# Patient Record
Sex: FEMALE | Race: BLACK OR AFRICAN AMERICAN | URBAN - METROPOLITAN AREA
[De-identification: names, ages, dates, MRNs, and addresses within clinical notes are randomized per-mention and may not be internally consistent; named-entity substitution may affect disease eponyms.]

---

## 2020-06-18 ENCOUNTER — LAB OUTSIDE AN ENCOUNTER (OUTPATIENT)
Dept: URBAN - METROPOLITAN AREA CLINIC 40 | Facility: CLINIC | Age: 55
End: 2020-06-18

## 2020-06-18 ENCOUNTER — OFFICE VISIT (OUTPATIENT)
Dept: URBAN - METROPOLITAN AREA CLINIC 40 | Facility: CLINIC | Age: 55
End: 2020-06-18
Payer: COMMERCIAL

## 2020-06-18 DIAGNOSIS — R10.84 GENERALIZED ABDOMINAL PAIN: ICD-10-CM

## 2020-06-18 DIAGNOSIS — K52.9 COLITIS: ICD-10-CM

## 2020-06-18 DIAGNOSIS — K62.5 RECTAL BLEEDING: ICD-10-CM

## 2020-06-18 DIAGNOSIS — K59.03 DRUG INDUCED CONSTIPATION: ICD-10-CM

## 2020-06-18 DIAGNOSIS — T40.2X5A ADVERSE EFFECT OF OTHER OPIOIDS, INITIAL ENCOUNTER: ICD-10-CM

## 2020-06-18 PROCEDURE — G2100 PT 66+ FRAILTY AND MED DEM: HCPCS | Performed by: INTERNAL MEDICINE

## 2020-06-18 PROCEDURE — G9716 BMI DOC ONL FUP NOT CMPLTD: HCPCS | Performed by: INTERNAL MEDICINE

## 2020-06-18 PROCEDURE — G9907 DOC MED RSN NO TBCO INTERV: HCPCS | Performed by: INTERNAL MEDICINE

## 2020-06-18 PROCEDURE — 99214 OFFICE O/P EST MOD 30 MIN: CPT | Performed by: INTERNAL MEDICINE

## 2020-06-18 RX ORDER — SODIUM, POTASSIUM,MAG SULFATES 17.5-3.13G
17.5-13.3-1.6 GM/177ML SOLUTION, RECONSTITUTED, ORAL ORAL
Qty: 177 MILLILITER | Refills: 0 | OUTPATIENT
Start: 2020-06-18 | End: 2020-06-19

## 2020-06-18 RX ORDER — DICYCLOMINE HYDROCHLORIDE 20 MG/1
TAKE 1 TABLET (20 MG) BY ORAL ROUTE 3 TIMES PER DAY FOR 30 DAYS TABLET ORAL
Qty: 90 | Refills: 3 | Status: ON HOLD | COMMUNITY
Start: 2020-04-14 | End: 2020-08-12

## 2020-06-18 RX ORDER — LUBIPROSTONE 24 UG/1
TAKE ONE CAPSULE BY MOUTH TWICE A DAY ***START IN THE EVENING ONCE DAILY WITH FOOD FOR 2 DAYS THEN INCREASE TO TWICE DAILY*** CAPSULE, GELATIN COATED ORAL
Qty: 60 | Refills: 2 | Status: ON HOLD | COMMUNITY
Start: 2015-04-14

## 2020-06-18 RX ORDER — ONDANSETRON HYDROCHLORIDE 4 MG/1
TAKE 2 TABLETS BY ORAL ROUTE 2 TIMES A DAY FOR 30 DAYS TABLET, FILM COATED ORAL 2
Qty: 120 | Refills: 1 | Status: ON HOLD | COMMUNITY
Start: 2020-04-28 | End: 2020-06-27

## 2020-06-18 RX ORDER — HYDROCORTISONE ACETATE 25 MG/1
INSERT 1 SUPPOSITORY BY RECTAL ROUTE 2 TIMES A DAY FOR 30 DAYS SUPPOSITORY RECTAL 2
Qty: 60 | Refills: 3 | Status: ON HOLD | COMMUNITY
Start: 2020-04-14 | End: 2020-08-12

## 2020-06-18 RX ORDER — PANTOPRAZOLE SODIUM 40 MG
TAKE ONE TABLET BY MOUTH TWICE A DAY TABLET, DELAYED RELEASE (ENTERIC COATED) ORAL
Qty: 60 | Refills: 2 | Status: ON HOLD | COMMUNITY
Start: 2015-04-14

## 2020-07-07 ENCOUNTER — OFFICE VISIT (OUTPATIENT)
Dept: URBAN - METROPOLITAN AREA SURGERY CENTER 30 | Facility: SURGERY CENTER | Age: 55
End: 2020-07-07
Payer: COMMERCIAL

## 2020-07-07 DIAGNOSIS — K62.5 ANAL BLEEDING: ICD-10-CM

## 2020-07-07 DIAGNOSIS — R19.7 ACUTE DIARRHEA: ICD-10-CM

## 2020-07-07 PROCEDURE — 45380 COLONOSCOPY AND BIOPSY: CPT | Performed by: INTERNAL MEDICINE

## 2020-07-07 PROCEDURE — G9937 DIG OR SURV COLSCO: HCPCS | Performed by: INTERNAL MEDICINE

## 2020-07-07 PROCEDURE — G8907 PT DOC NO EVENTS ON DISCHARG: HCPCS | Performed by: INTERNAL MEDICINE

## 2020-07-07 RX ORDER — DICYCLOMINE HYDROCHLORIDE 20 MG/1
TAKE 1 TABLET (20 MG) BY ORAL ROUTE 3 TIMES PER DAY FOR 30 DAYS TABLET ORAL
Qty: 90 | Refills: 3 | Status: ON HOLD | COMMUNITY
Start: 2020-04-14 | End: 2020-08-12

## 2020-07-07 RX ORDER — PANTOPRAZOLE SODIUM 40 MG
TAKE ONE TABLET BY MOUTH TWICE A DAY TABLET, DELAYED RELEASE (ENTERIC COATED) ORAL
Qty: 60 | Refills: 2 | Status: ON HOLD | COMMUNITY
Start: 2015-04-14

## 2020-07-07 RX ORDER — LUBIPROSTONE 24 UG/1
TAKE ONE CAPSULE BY MOUTH TWICE A DAY ***START IN THE EVENING ONCE DAILY WITH FOOD FOR 2 DAYS THEN INCREASE TO TWICE DAILY*** CAPSULE, GELATIN COATED ORAL
Qty: 60 | Refills: 2 | Status: ON HOLD | COMMUNITY
Start: 2015-04-14

## 2020-07-07 RX ORDER — HYDROCORTISONE ACETATE 25 MG/1
INSERT 1 SUPPOSITORY BY RECTAL ROUTE 2 TIMES A DAY FOR 30 DAYS SUPPOSITORY RECTAL 2
Qty: 60 | Refills: 3 | Status: ON HOLD | COMMUNITY
Start: 2020-04-14 | End: 2020-08-12

## 2020-07-07 NOTE — HPI-TODAY'S VISIT:
She had colonoscopy in 2017 that was negative other than noting hemorrhoids.  She had an upper endoscopy in 2013 that was essentially unremarkable, empiric dilation was performed at that time.  She saw Dr. Christian couple of months ago for evaluation of rectal bleeding.  She complained of several months of passing blood and mucus.  CT angiogram revealed no active bleeding in the hospital but there was evidence of colitis extending from the transverse colon to the sigmoid colon and the suspicion was acute ischemic colitis. Since last visit patient has seen her pain doctor and was prescribed Movantik.  She took 1 pill only.  She does not want to take laxatives.  She continues to take oxycodone and experiencing opioid-induced constipation.  The rectal bleeding has stopped but she still has mucus and abdominal cramping diffusely.  She would like acute intervention and evaluation of possible.

## 2020-08-06 ENCOUNTER — OFFICE VISIT (OUTPATIENT)
Dept: URBAN - METROPOLITAN AREA CLINIC 40 | Facility: CLINIC | Age: 55
End: 2020-08-06

## 2020-08-24 ENCOUNTER — OFFICE VISIT (OUTPATIENT)
Dept: URBAN - METROPOLITAN AREA TELEHEALTH 2 | Facility: TELEHEALTH | Age: 55
End: 2020-08-24
Payer: COMMERCIAL

## 2020-08-24 DIAGNOSIS — R19.7 DIARRHEA: ICD-10-CM

## 2020-08-24 DIAGNOSIS — K57.30 COLON, DIVERTICULOSIS: ICD-10-CM

## 2020-08-24 DIAGNOSIS — R10.84 ABDOMINAL PAIN, GENERALIZED: ICD-10-CM

## 2020-08-24 DIAGNOSIS — K64.8 INTERNAL HEMORRHOIDS: ICD-10-CM

## 2020-08-24 PROCEDURE — G8427 DOCREV CUR MEDS BY ELIG CLIN: HCPCS | Performed by: INTERNAL MEDICINE

## 2020-08-24 PROCEDURE — 99213 OFFICE O/P EST LOW 20 MIN: CPT | Performed by: INTERNAL MEDICINE

## 2020-08-24 PROCEDURE — 3017F COLORECTAL CA SCREEN DOC REV: CPT | Performed by: INTERNAL MEDICINE

## 2020-08-24 RX ORDER — PANTOPRAZOLE SODIUM 40 MG
TAKE ONE TABLET BY MOUTH TWICE A DAY TABLET, DELAYED RELEASE (ENTERIC COATED) ORAL
Qty: 60 | Refills: 2 | Status: ON HOLD | COMMUNITY
Start: 2015-04-14

## 2020-08-24 RX ORDER — COLESTIPOL HYDROCHLORIDE 1 G/1
1 TABLET TABLET, FILM COATED ORAL BID PRN
Qty: 60 | Refills: 0 | OUTPATIENT
Start: 2020-08-24

## 2020-08-24 RX ORDER — PANTOPRAZOLE SODIUM 40 MG
1 TABLET TABLET, DELAYED RELEASE (ENTERIC COATED) ORAL ONCE A DAY
Qty: 30 | Refills: 0
Start: 2015-04-14

## 2020-08-24 RX ORDER — LUBIPROSTONE 24 UG/1
TAKE ONE CAPSULE BY MOUTH TWICE A DAY ***START IN THE EVENING ONCE DAILY WITH FOOD FOR 2 DAYS THEN INCREASE TO TWICE DAILY*** CAPSULE, GELATIN COATED ORAL
Qty: 60 | Refills: 2 | Status: ON HOLD | COMMUNITY
Start: 2015-04-14

## 2020-08-24 NOTE — PHYSICAL EXAM GASTROINTESTINAL
Self Exam: Abdomen soft, TTP in midepigastric region without guarding or rebound tendernness, non-distended

## 2020-08-24 NOTE — HPI-TODAY'S VISIT:
Ms. Mike is a 55 year old female who presents for telehealth visit. She did recently have colonoscopy with random biopsies by Dr. Shearer.  Essentially normal findings outside of small, nonbleeding internal hemorrhoids and sigmoid diverticulosis without diverticulitis.  No evidence of colitis.  The exam was technically difficult due to significant looping.  No other findings on exam.  Random biopsies unremarkable.  She had a prior colonoscopy in 2017 which was negative other than noting hemorrhoids.  She had an upper endoscopy in 2013 that was essentially unremarkable, empiric dilation was performed at that time.  She saw Dr. Mitchell several months ago for evaluation of rectal bleeding.  She complained of several months of passing blood and mucus.  CT angiogram revealed no active bleeding in the hospital but there was evidence of colitis extending from the transverse colon to the sigmoid colon and the suspicion was acute ischemic colitis. She has chronic pain on opioid therapy. With previous visit, she complained of rectal bleeding. The rectal bleeding has stopped but she still has mucus and abdominal cramping diffusely with diarrhea.  Today, she admits that she continues to have loose stools, up to 6-7 times daily, without rectal bleeding.  Having general abdominal cramping and focused epigastric discomfort with heartburn and indigestion.  Denies nausea, vomiting, or dysphagia.  Fair appetite, typically eating only once daily.  She is visiting with family in [place] and will return to [place] in the next 1 to 2 weeks.  Admits that discomfort in the epigastric region is exacerbated by ingestion of greasy food as well as dairy, which she is trying to limit.  She does not drink alcohol or eat much sweets.  Denies use of NSAIDs.  No other complaints.

## 2020-09-03 ENCOUNTER — OFFICE VISIT (OUTPATIENT)
Dept: URBAN - METROPOLITAN AREA CLINIC 40 | Facility: CLINIC | Age: 55
End: 2020-09-03

## 2020-09-28 ENCOUNTER — OFFICE VISIT (OUTPATIENT)
Dept: URBAN - METROPOLITAN AREA CLINIC 40 | Facility: CLINIC | Age: 55
End: 2020-09-28

## 2020-09-28 RX ORDER — PANTOPRAZOLE SODIUM 40 MG
1 TABLET TABLET, DELAYED RELEASE (ENTERIC COATED) ORAL ONCE A DAY
Qty: 30 | Refills: 0 | Status: ACTIVE | COMMUNITY
Start: 2015-04-14

## 2020-09-28 RX ORDER — LUBIPROSTONE 24 UG/1
TAKE ONE CAPSULE BY MOUTH TWICE A DAY ***START IN THE EVENING ONCE DAILY WITH FOOD FOR 2 DAYS THEN INCREASE TO TWICE DAILY*** CAPSULE, GELATIN COATED ORAL
Qty: 60 | Refills: 2 | Status: ON HOLD | COMMUNITY
Start: 2015-04-14

## 2020-09-28 RX ORDER — COLESTIPOL HYDROCHLORIDE 1 G/1
1 TABLET TABLET, FILM COATED ORAL BID PRN
Qty: 60 | Refills: 0 | Status: ACTIVE | COMMUNITY
Start: 2020-08-24

## 2020-10-15 ENCOUNTER — OFFICE VISIT (OUTPATIENT)
Dept: URBAN - METROPOLITAN AREA CLINIC 40 | Facility: CLINIC | Age: 55
End: 2020-10-15
Payer: COMMERCIAL

## 2020-10-15 ENCOUNTER — WEB ENCOUNTER (OUTPATIENT)
Dept: URBAN - METROPOLITAN AREA CLINIC 40 | Facility: CLINIC | Age: 55
End: 2020-10-15

## 2020-10-15 ENCOUNTER — LAB OUTSIDE AN ENCOUNTER (OUTPATIENT)
Dept: URBAN - METROPOLITAN AREA CLINIC 40 | Facility: CLINIC | Age: 55
End: 2020-10-15

## 2020-10-15 DIAGNOSIS — R63.4 WEIGHT LOSS: ICD-10-CM

## 2020-10-15 DIAGNOSIS — R11.0 NAUSEA: ICD-10-CM

## 2020-10-15 DIAGNOSIS — R07.89 CHEST PAIN, NON-CARDIAC: ICD-10-CM

## 2020-10-15 DIAGNOSIS — K21.9 GERD WITHOUT ESOPHAGITIS: ICD-10-CM

## 2020-10-15 PROBLEM — 266435005: Status: ACTIVE | Noted: 2020-10-15

## 2020-10-15 PROCEDURE — 99214 OFFICE O/P EST MOD 30 MIN: CPT | Performed by: INTERNAL MEDICINE

## 2020-10-15 PROCEDURE — 3017F COLORECTAL CA SCREEN DOC REV: CPT | Performed by: INTERNAL MEDICINE

## 2020-10-15 PROCEDURE — G8420 CALC BMI NORM PARAMETERS: HCPCS | Performed by: INTERNAL MEDICINE

## 2020-10-15 PROCEDURE — 1036F TOBACCO NON-USER: CPT | Performed by: INTERNAL MEDICINE

## 2020-10-15 PROCEDURE — G8430 EC AT DOC MEDREC PT NOT ELIG: HCPCS | Performed by: INTERNAL MEDICINE

## 2020-10-15 PROCEDURE — G8484 FLU IMMUNIZE NO ADMIN: HCPCS | Performed by: INTERNAL MEDICINE

## 2020-10-15 RX ORDER — COLESTIPOL HYDROCHLORIDE 1 G/1
1 TABLET TABLET, FILM COATED ORAL BID PRN
Qty: 60 | Refills: 0 | Status: ACTIVE | COMMUNITY
Start: 2020-08-24

## 2020-10-15 RX ORDER — LUBIPROSTONE 24 UG/1
TAKE ONE CAPSULE BY MOUTH TWICE A DAY ***START IN THE EVENING ONCE DAILY WITH FOOD FOR 2 DAYS THEN INCREASE TO TWICE DAILY*** CAPSULE, GELATIN COATED ORAL
Qty: 60 | Refills: 2 | Status: ON HOLD | COMMUNITY
Start: 2015-04-14

## 2020-10-15 RX ORDER — PANTOPRAZOLE SODIUM 40 MG
1 TABLET TABLET, DELAYED RELEASE (ENTERIC COATED) ORAL ONCE A DAY
Qty: 30 | Refills: 0 | Status: ACTIVE | COMMUNITY
Start: 2015-04-14

## 2020-10-15 NOTE — HPI-TODAY'S VISIT:
Patient presents for procedure follow-up.  She was seen in June for evaluation of colitis and rectal bleeding.  At that time there was suspicion more of opioid induced constipation versus stercoral ulcer presenting as localized colon inflammation on imaging.  She does have a several month history of passing blood and mucus.  CT showed colitis extending from the transverse colon to the sigmoid colon with concern for ischemic colitis versus stercoral colitis. Colonoscopy in July was negative other than minimal diverticulitis and small hemorrhoids.  Biopsies from the colon were negative. Since her last visit in July she is followed with Padma Portillo via telemedicine.  She is actually quite miserable at this time in her own words.  She cannot get any sleep.  She notes diffuse abdominal cramping but mainly in the lower abdomen and also the epigastric region.  She also takes her hand and moves it up and down her chest and describes a burning type pain in the chest.  She is now on Protonix but it really did not provide much relief of her symptoms.  Her constipation resolved.  She stopped taking opioid therapy and now she has more loose stools on occasion.  No issues with rectal bleeding at this time.

## 2020-10-15 NOTE — PHYSICAL EXAM CONSTITUTIONAL:
well developed, well nourished , in no acute distress , ambulating without difficulty , normal communication ability
yes

## 2020-10-16 ENCOUNTER — CLAIMS CREATED FROM THE CLAIM WINDOW (OUTPATIENT)
Dept: URBAN - METROPOLITAN AREA CLINIC 4 | Facility: CLINIC | Age: 55
End: 2020-10-16
Payer: COMMERCIAL

## 2020-10-16 ENCOUNTER — OFFICE VISIT (OUTPATIENT)
Dept: URBAN - METROPOLITAN AREA SURGERY CENTER 30 | Facility: SURGERY CENTER | Age: 55
End: 2020-10-16
Payer: COMMERCIAL

## 2020-10-16 DIAGNOSIS — K31.89 OTHER DISEASES OF STOMACH AND DUODENUM: ICD-10-CM

## 2020-10-16 DIAGNOSIS — K29.60 ADENOPAPILLOMATOSIS GASTRICA: ICD-10-CM

## 2020-10-16 DIAGNOSIS — K21.9 ACID REFLUX: ICD-10-CM

## 2020-10-16 DIAGNOSIS — K21.0 GASTRO-ESOPHAGEAL REFLUX DISEASE WITH ESOPHAGITIS: ICD-10-CM

## 2020-10-16 DIAGNOSIS — K29.40 CHRONIC ATROPHIC GASTRITIS WITHOUT BLEEDING: ICD-10-CM

## 2020-10-16 PROCEDURE — 43239 EGD BIOPSY SINGLE/MULTIPLE: CPT | Performed by: INTERNAL MEDICINE

## 2020-10-16 PROCEDURE — 88305 TISSUE EXAM BY PATHOLOGIST: CPT | Performed by: PATHOLOGY

## 2020-10-16 PROCEDURE — 88342 IMHCHEM/IMCYTCHM 1ST ANTB: CPT | Performed by: PATHOLOGY

## 2020-10-16 PROCEDURE — 88312 SPECIAL STAINS GROUP 1: CPT | Performed by: PATHOLOGY

## 2020-10-16 PROCEDURE — G8907 PT DOC NO EVENTS ON DISCHARG: HCPCS | Performed by: INTERNAL MEDICINE

## 2021-05-10 ENCOUNTER — LAB OUTSIDE AN ENCOUNTER (OUTPATIENT)
Dept: URBAN - METROPOLITAN AREA CLINIC 40 | Facility: CLINIC | Age: 56
End: 2021-05-10

## 2021-05-10 ENCOUNTER — OFFICE VISIT (OUTPATIENT)
Dept: URBAN - METROPOLITAN AREA CLINIC 40 | Facility: CLINIC | Age: 56
End: 2021-05-10
Payer: COMMERCIAL

## 2021-05-10 DIAGNOSIS — R10.9 ABDOMINAL PAIN: ICD-10-CM

## 2021-05-10 DIAGNOSIS — R11.0 NAUSEA: ICD-10-CM

## 2021-05-10 PROCEDURE — 99214 OFFICE O/P EST MOD 30 MIN: CPT | Performed by: INTERNAL MEDICINE

## 2021-05-10 RX ORDER — ONDANSETRON HYDROCHLORIDE 8 MG/1
1 TABLET AS NEEDED TABLET, FILM COATED ORAL
Qty: 90 | Refills: 0 | OUTPATIENT
Start: 2021-05-10

## 2021-05-10 RX ORDER — HYOSCYAMINE SULFATE 0.12 MG/1
1 TABLET UNDER THE TONGUE AND ALLOW TO DISSOLVE  AS NEEDED TABLET SUBLINGUAL THREE TIMES A DAY
Qty: 90 | Refills: 0 | OUTPATIENT

## 2021-05-10 RX ORDER — COLESTIPOL HYDROCHLORIDE 1 G/1
1 TABLET TABLET, FILM COATED ORAL BID PRN
Qty: 60 | Refills: 0 | Status: ACTIVE | COMMUNITY
Start: 2020-08-24

## 2021-05-10 RX ORDER — LUBIPROSTONE 24 UG/1
TAKE ONE CAPSULE BY MOUTH TWICE A DAY ***START IN THE EVENING ONCE DAILY WITH FOOD FOR 2 DAYS THEN INCREASE TO TWICE DAILY*** CAPSULE, GELATIN COATED ORAL
Qty: 60 | Refills: 2 | Status: ON HOLD | COMMUNITY
Start: 2015-04-14

## 2021-05-10 RX ORDER — PANTOPRAZOLE SODIUM 40 MG
1 TABLET TABLET, DELAYED RELEASE (ENTERIC COATED) ORAL ONCE A DAY
Qty: 30 | Refills: 0 | Status: ACTIVE | COMMUNITY
Start: 2015-04-14

## 2021-05-10 NOTE — HPI-TODAY'S VISIT:
Ms. Mike is a 56-year-old black female known to Dr. Shearer seen as an acute visit for abdominal pain.  Review of her chart shows she was last seen in October.  At that appointment she was complaining of burning chest discomfort and nausea.  Dr. Shearer an EGD in October 2020 that was grossly normal.  Biopsies showed reflux esophagitis as well as chronic gastritis.  She was treated with a PPI.  Patient states that pain is better but over the last week she has had significant sharp abdominal pain.  Is mostly on the left side.  It goes through to her back.  She is endorsing nausea as well.  She is moving her bowels.  Stools are loose and go to 3 times a day.  She has not had any rectal bleeding.  She denies any fevers or chills.  Pain is worse at night.  She admits to taking some NSAIDs prior to the pain.  Recall Dr. Shearer did a colonoscopy in July 2020 where sigmoid diverticula were found.  This was in response to a CT showing colitis.  Random biopsies the time of the colonoscopy were negative for inflammatory bowel disease.

## 2021-05-10 NOTE — PHYSICAL EXAM GASTROINTESTINAL
Abdomen , soft, diffuse moderate  tenderness L> R, nondistended , no guarding or rigidity , no masses palpable , normal bowel sounds , Liver and Spleen , no hepatomegaly present , no hepatosplenomegaly , liver nontender , spleen not palpable

## 2021-05-11 ENCOUNTER — TELEPHONE ENCOUNTER (OUTPATIENT)
Dept: URBAN - METROPOLITAN AREA CLINIC 98 | Facility: CLINIC | Age: 56
End: 2021-05-11

## 2021-05-12 LAB
A/G RATIO: 1.7
ALBUMIN: 4.7
ALKALINE PHOSPHATASE: 69
ALT (SGPT): 9
APPEARANCE: CLEAR
AST (SGOT): 14
BACTERIA: (no result)
BASO (ABSOLUTE): 0
BASOS: 1
BILIRUBIN, TOTAL: 0.4
BILIRUBIN: NEGATIVE
BUN/CREATININE RATIO: 15
BUN: 12
C-REACTIVE PROTEIN, QUANT: <1
CALCIUM: 9.8
CARBON DIOXIDE, TOTAL: 21
CAST TYPE: (no result)
CASTS: (no result)
CHLORIDE: 111
COMMENT: (no result)
CREATININE: 0.79
CRYSTAL TYPE: (no result)
CRYSTALS: (no result)
EGFR IF AFRICN AM: 96
EGFR IF NONAFRICN AM: 83
EOS (ABSOLUTE): 0
EOS: 1
EPITHELIAL CELLS (NON RENAL): (no result)
EPITHELIAL CELLS (RENAL): (no result)
GLOBULIN, TOTAL: 2.8
GLUCOSE: 85
GLUCOSE: NEGATIVE
HEMATOCRIT: 34.8
HEMATOLOGY COMMENTS:: (no result)
HEMOGLOBIN: 11.4
IMMATURE CELLS: (no result)
IMMATURE GRANS (ABS): 0
IMMATURE GRANULOCYTES: 0
KETONES: NEGATIVE
LIPASE: 29
LYMPHS (ABSOLUTE): 1.9
LYMPHS: 61
MCH: 31.7
MCHC: 32.8
MCV: 97
MICROSCOPIC EXAMINATION: (no result)
MICROSCOPIC EXAMINATION: (no result)
MONOCYTES(ABSOLUTE): 0.3
MONOCYTES: 9
MUCUS THREADS: (no result)
NEUTROPHILS (ABSOLUTE): 0.9
NEUTROPHILS: 28
NITRITE, URINE: NEGATIVE
NRBC: (no result)
OCCULT BLOOD: (no result)
PH: 6.5
PLATELETS: 206
POTASSIUM: 4.7
PROTEIN, TOTAL: 7.5
PROTEIN: NEGATIVE
RBC: (no result)
RBC: 3.6
RDW: 12.5
SODIUM: 144
SPECIFIC GRAVITY: 1.01
TRICHOMONAS: (no result)
URINE-COLOR: YELLOW
UROBILINOGEN,SEMI-QN: 0.2
WBC ESTERASE: NEGATIVE
WBC: (no result)
WBC: 3.1
YEAST: (no result)

## 2022-01-26 ENCOUNTER — LAB OUTSIDE AN ENCOUNTER (OUTPATIENT)
Dept: URBAN - METROPOLITAN AREA CLINIC 40 | Facility: CLINIC | Age: 57
End: 2022-01-26

## 2022-01-26 ENCOUNTER — TELEPHONE ENCOUNTER (OUTPATIENT)
Dept: URBAN - METROPOLITAN AREA CLINIC 40 | Facility: CLINIC | Age: 57
End: 2022-01-26

## 2022-01-26 ENCOUNTER — OFFICE VISIT (OUTPATIENT)
Dept: URBAN - METROPOLITAN AREA CLINIC 74 | Facility: CLINIC | Age: 57
End: 2022-01-26
Payer: COMMERCIAL

## 2022-01-26 DIAGNOSIS — R10.9 ABDOMINAL PAIN: ICD-10-CM

## 2022-01-26 DIAGNOSIS — K62.5 RECTAL BLEEDING: ICD-10-CM

## 2022-01-26 DIAGNOSIS — R42 LIGHTHEADED: ICD-10-CM

## 2022-01-26 DIAGNOSIS — R19.7 DIARRHEA, UNSPECIFIED TYPE: ICD-10-CM

## 2022-01-26 DIAGNOSIS — R11.0 NAUSEA: ICD-10-CM

## 2022-01-26 PROCEDURE — 99214 OFFICE O/P EST MOD 30 MIN: CPT | Performed by: INTERNAL MEDICINE

## 2022-01-26 RX ORDER — HYOSCYAMINE SULFATE 0.12 MG/1
1 TABLET UNDER THE TONGUE AND ALLOW TO DISSOLVE  AS NEEDED TABLET SUBLINGUAL THREE TIMES A DAY
Qty: 90 | Refills: 0 | Status: DISCONTINUED | COMMUNITY

## 2022-01-26 RX ORDER — LUBIPROSTONE 24 UG/1
TAKE ONE CAPSULE BY MOUTH TWICE A DAY ***START IN THE EVENING ONCE DAILY WITH FOOD FOR 2 DAYS THEN INCREASE TO TWICE DAILY*** CAPSULE, GELATIN COATED ORAL
Qty: 60 | Refills: 2 | Status: DISCONTINUED | COMMUNITY
Start: 2015-04-14

## 2022-01-26 RX ORDER — ONDANSETRON HYDROCHLORIDE 4 MG/1
1 TABLET TABLET, FILM COATED ORAL
Qty: 90 | Refills: 3 | OUTPATIENT
Start: 2022-01-26

## 2022-01-26 RX ORDER — METRONIDAZOLE 500 MG/1
1 TABLET TABLET, FILM COATED ORAL THREE TIMES A DAY
Qty: 21 TABLET | OUTPATIENT
Start: 2022-01-26 | End: 2022-02-02

## 2022-01-26 RX ORDER — ONDANSETRON HYDROCHLORIDE 8 MG/1
1 TABLET AS NEEDED TABLET, FILM COATED ORAL
Qty: 90 | Refills: 0 | Status: DISCONTINUED | COMMUNITY
Start: 2021-05-10

## 2022-01-26 RX ORDER — COLESTIPOL HYDROCHLORIDE 1 G/1
1 TABLET TABLET, FILM COATED ORAL BID PRN
Qty: 60 | Refills: 0 | Status: DISCONTINUED | COMMUNITY
Start: 2020-08-24

## 2022-01-26 RX ORDER — CIPROFLOXACIN 500 MG/1
1 TABLET TABLET, FILM COATED ORAL
Qty: 14 TABLET | OUTPATIENT
Start: 2022-01-26 | End: 2022-02-02

## 2022-01-26 RX ORDER — PANTOPRAZOLE SODIUM 40 MG
1 TABLET TABLET, DELAYED RELEASE (ENTERIC COATED) ORAL ONCE A DAY
Qty: 30 | Refills: 0 | Status: DISCONTINUED | COMMUNITY
Start: 2015-04-14

## 2022-01-26 NOTE — HPI-TODAY'S VISIT:
ER follow up. Seen this week after altered mental status at work after taking pain pill. CT head and labs at ER negative other than borderline uti, drug screen negative, mild chronic anemia, Hgb 10.8, normal MCV. Ms. Mike is a 56-year-old black female known to myself, seen last year by Dr. Mota. Here for an acute visit for abdominal pain.  Similar to prior visits. Review of her chart shows she was last seen in October.  At that appointment she was complaining of burning chest discomfort and nausea.  EGD in October 2020 that was grossly normal.  Biopsies showed reflux esophagitis as well as chronic gastritis.  Colonoscopy 2017 with Dr. Mitchell normal and colonoscopy with myself 2020 normal.  Patient states that pain is better but over the last week she has had significant sharp abdominal pain.  Is mostly on the left side.  It goes through to her back.  She is endorsing nausea as well.  She is moving her bowels.  Stools are loose and go to 3 times a day.  She has not had any rectal bleeding.  She denies any fevers or chills.  Pain is worse at night.  She admits to taking some NSAIDs prior to the pain.   Random biopsies the time of the colonoscopy were negative for inflammatory bowel disease. ==== CT 5/21 negative for acute changes and no inflammation. Small liver hemangioma stable.

## 2022-02-04 LAB — GASTROINTESTINAL PATHOGEN: (no result)

## 2022-02-16 ENCOUNTER — OFFICE VISIT (OUTPATIENT)
Dept: URBAN - METROPOLITAN AREA CLINIC 74 | Facility: CLINIC | Age: 57
End: 2022-02-16
Payer: COMMERCIAL

## 2022-02-16 ENCOUNTER — WEB ENCOUNTER (OUTPATIENT)
Dept: URBAN - METROPOLITAN AREA CLINIC 74 | Facility: CLINIC | Age: 57
End: 2022-02-16

## 2022-02-16 ENCOUNTER — TELEPHONE ENCOUNTER (OUTPATIENT)
Dept: URBAN - METROPOLITAN AREA CLINIC 74 | Facility: CLINIC | Age: 57
End: 2022-02-16

## 2022-02-16 ENCOUNTER — TELEPHONE ENCOUNTER (OUTPATIENT)
Dept: URBAN - METROPOLITAN AREA CLINIC 92 | Facility: CLINIC | Age: 57
End: 2022-02-16

## 2022-02-16 DIAGNOSIS — K76.89 LIVER CYST: ICD-10-CM

## 2022-02-16 DIAGNOSIS — R11.0 NAUSEA: ICD-10-CM

## 2022-02-16 DIAGNOSIS — R19.7 DIARRHEA, UNSPECIFIED TYPE: ICD-10-CM

## 2022-02-16 DIAGNOSIS — R10.9 ABDOMINAL PAIN: ICD-10-CM

## 2022-02-16 DIAGNOSIS — R42 LIGHTHEADED: ICD-10-CM

## 2022-02-16 DIAGNOSIS — K62.5 RECTAL BLEEDING: ICD-10-CM

## 2022-02-16 PROCEDURE — 99214 OFFICE O/P EST MOD 30 MIN: CPT | Performed by: INTERNAL MEDICINE

## 2022-02-16 RX ORDER — ONDANSETRON HYDROCHLORIDE 4 MG/1
1 TABLET TABLET, FILM COATED ORAL
Qty: 90 | Refills: 3 | Status: ACTIVE | COMMUNITY
Start: 2022-01-26

## 2022-02-16 RX ORDER — ONDANSETRON HYDROCHLORIDE 4 MG/1
1 TABLET TABLET, FILM COATED ORAL
Qty: 90 | Refills: 3 | OUTPATIENT

## 2022-02-16 RX ORDER — RIFAXIMIN 550 MG/1
1 TABLET TABLET ORAL THREE TIMES A DAY
Qty: 42 TABLET | Refills: 2 | OUTPATIENT
Start: 2022-02-16 | End: 2022-03-30

## 2022-02-16 NOTE — HPI-TODAY'S VISIT:
ER follow up. Seen this week, ongoing fatigue, diarrhea, cramping.  GPP negative last visit and cipro/flagyl didn't help. IBS flare, ongoing, no red flag s/s.  CT head and labs at ER negative other than borderline uti, drug screen negative, mild chronic anemia, Hgb 10.8, normal MCV. Ms. Mike is a 56-year-old black female known to myself, seen last year by Dr. Mota. Here for an acute visit for abdominal pain.  Similar to prior visits. Review of her chart shows she was last seen in October.  At that appointment she was complaining of burning chest discomfort and nausea.  EGD in October 2020 that was grossly normal.  Biopsies showed reflux esophagitis as well as chronic gastritis.  Colonoscopy 2017 with Dr. Mitchell normal and colonoscopy with myself 2020 normal.  Patient states that pain is better but over the last week she has had significant sharp abdominal pain.  Is mostly on the left side.  It goes through to her back.  She is endorsing nausea as well.  She is moving her bowels.  Stools are loose and go to 3 times a day.  She has not had any rectal bleeding.  She denies any fevers or chills.  Pain is worse at night.  She admits to taking some NSAIDs prior to the pain.   Random biopsies the time of the colonoscopy were negative for inflammatory bowel disease. ==== CT 5/21 negative for acute changes and no inflammation. Small liver hemangioma stable.

## 2022-03-23 ENCOUNTER — OFFICE VISIT (OUTPATIENT)
Dept: URBAN - METROPOLITAN AREA CLINIC 74 | Facility: CLINIC | Age: 57
End: 2022-03-23

## 2022-03-23 RX ORDER — RIFAXIMIN 550 MG/1
1 TABLET TABLET ORAL THREE TIMES A DAY
Qty: 42 TABLET | Refills: 2 | Status: ACTIVE | COMMUNITY
Start: 2022-02-16 | End: 2022-03-30

## 2022-03-23 RX ORDER — ONDANSETRON HYDROCHLORIDE 4 MG/1
1 TABLET TABLET, FILM COATED ORAL
Qty: 90 | Refills: 3 | Status: ACTIVE | COMMUNITY

## 2022-05-11 ENCOUNTER — OFFICE VISIT (OUTPATIENT)
Dept: URBAN - METROPOLITAN AREA CLINIC 74 | Facility: CLINIC | Age: 57
End: 2022-05-11

## 2022-05-11 ENCOUNTER — TELEPHONE ENCOUNTER (OUTPATIENT)
Dept: URBAN - METROPOLITAN AREA CLINIC 74 | Facility: CLINIC | Age: 57
End: 2022-05-11

## 2022-05-11 RX ORDER — ONDANSETRON HYDROCHLORIDE 4 MG/1
1 TABLET TABLET, FILM COATED ORAL
Qty: 90 | Refills: 3 | Status: ACTIVE | COMMUNITY

## 2022-05-12 ENCOUNTER — TELEPHONE ENCOUNTER (OUTPATIENT)
Dept: URBAN - METROPOLITAN AREA CLINIC 74 | Facility: CLINIC | Age: 57
End: 2022-05-12

## 2023-01-09 ENCOUNTER — WEB ENCOUNTER (OUTPATIENT)
Dept: URBAN - METROPOLITAN AREA CLINIC 74 | Facility: CLINIC | Age: 58
End: 2023-01-09

## 2023-01-09 ENCOUNTER — OFFICE VISIT (OUTPATIENT)
Dept: URBAN - METROPOLITAN AREA CLINIC 74 | Facility: CLINIC | Age: 58
End: 2023-01-09
Payer: COMMERCIAL

## 2023-01-09 VITALS
HEART RATE: 64 BPM | OXYGEN SATURATION: 96 % | SYSTOLIC BLOOD PRESSURE: 102 MMHG | TEMPERATURE: 97.7 F | BODY MASS INDEX: 21.96 KG/M2 | DIASTOLIC BLOOD PRESSURE: 64 MMHG | HEIGHT: 65 IN | WEIGHT: 131.8 LBS

## 2023-01-09 DIAGNOSIS — K58.0 IRRITABLE BOWEL SYNDROME WITH DIARRHEA: ICD-10-CM

## 2023-01-09 DIAGNOSIS — K62.5 RECTAL BLEEDING: ICD-10-CM

## 2023-01-09 DIAGNOSIS — J38.7 MUCUS POOLING IN LARYNX: ICD-10-CM

## 2023-01-09 DIAGNOSIS — K76.89 LIVER CYST: ICD-10-CM

## 2023-01-09 DIAGNOSIS — R11.0 NAUSEA: ICD-10-CM

## 2023-01-09 DIAGNOSIS — R10.9 ABDOMINAL PAIN: ICD-10-CM

## 2023-01-09 DIAGNOSIS — R42 LIGHTHEADED: ICD-10-CM

## 2023-01-09 PROCEDURE — 99214 OFFICE O/P EST MOD 30 MIN: CPT | Performed by: INTERNAL MEDICINE

## 2023-01-09 RX ORDER — RIFAXIMIN 550 MG/1
1 TABLET TABLET ORAL THREE TIMES A DAY
Qty: 42 TABLET | Refills: 2 | OUTPATIENT
Start: 2023-01-09 | End: 2023-02-20

## 2023-01-09 RX ORDER — FAMOTIDINE 40 MG/1
1 TABLET AT BEDTIME AS NEEDED TABLET, FILM COATED ORAL ONCE A DAY
Qty: 90 TABLET | Refills: 3 | OUTPATIENT
Start: 2023-01-09

## 2023-01-09 RX ORDER — ONDANSETRON HYDROCHLORIDE 4 MG/1
1 TABLET TABLET, FILM COATED ORAL
Qty: 90 | Refills: 3 | Status: ACTIVE | COMMUNITY

## 2023-01-09 RX ORDER — PANTOPRAZOLE SODIUM 40 MG/1
1 TABLET TABLET, DELAYED RELEASE ORAL TWICE A DAY
Qty: 180 TABLET | Refills: 3 | OUTPATIENT
Start: 2023-01-09

## 2023-01-09 NOTE — HPI-TODAY'S VISIT:
Pt last seen 5/2022, ongoing fatigue, diarrhea, cramping. Also mucus and pnd. GPP negative last visit and cipro/flagyl didn't help. IBS flare, ongoing, no red flag s/s.  ALso, mucus in throat, it is horrible in her words, clears throat and causes her distress. Seen by ENT multiple times with negative workup and told it was GERD. She otherwise denies GERD. Tried ENT abx and sprays without relief. CT head and labs at ER negative other than borderline uti, drug screen negative, mild chronic anemia, Hgb 10.8, normal MCV. Ms. Mike is a 56-year-old black female known to myself, seen last year by Dr. Mota. Here for an acute visit for abdominal pain.  Similar to prior visits. Review of her chart shows she was last seen in October.  At that appointment she was complaining of burning chest discomfort and nausea.  EGD in October 2020 that was grossly normal.  Biopsies showed reflux esophagitis as well as chronic gastritis.  Colonoscopy 2017 with Dr. Mitchell normal and colonoscopy with myself 2020 normal.  Patient states that pain is better but over the last week she has had significant sharp abdominal pain.  Is mostly on the left side.  It goes through to her back.  She is endorsing nausea as well.  She is moving her bowels.  Stools are loose and go to 3 times a day.  She has not had any rectal bleeding.  She denies any fevers or chills.  Pain is worse at night.  She admits to taking some NSAIDs prior to the pain.   Random biopsies the time of the colonoscopy were negative for inflammatory bowel disease. ==== CT 5/21 negative for acute changes and no inflammation. Small liver hemangioma stable.

## 2023-03-06 ENCOUNTER — OFFICE VISIT (OUTPATIENT)
Dept: URBAN - METROPOLITAN AREA CLINIC 74 | Facility: CLINIC | Age: 58
End: 2023-03-06
Payer: COMMERCIAL

## 2023-03-06 ENCOUNTER — LAB OUTSIDE AN ENCOUNTER (OUTPATIENT)
Dept: URBAN - METROPOLITAN AREA CLINIC 74 | Facility: CLINIC | Age: 58
End: 2023-03-06

## 2023-03-06 VITALS
TEMPERATURE: 97.5 F | HEART RATE: 51 BPM | HEIGHT: 65 IN | OXYGEN SATURATION: 95 % | WEIGHT: 129 LBS | DIASTOLIC BLOOD PRESSURE: 60 MMHG | SYSTOLIC BLOOD PRESSURE: 102 MMHG | BODY MASS INDEX: 21.49 KG/M2

## 2023-03-06 DIAGNOSIS — K76.89 LIVER CYST: ICD-10-CM

## 2023-03-06 DIAGNOSIS — R11.0 NAUSEA: ICD-10-CM

## 2023-03-06 DIAGNOSIS — K58.2 MIXED IRRITABLE BOWEL SYNDROME: ICD-10-CM

## 2023-03-06 DIAGNOSIS — J38.7 MUCUS POOLING IN LARYNX: ICD-10-CM

## 2023-03-06 DIAGNOSIS — R42 LIGHTHEADED: ICD-10-CM

## 2023-03-06 DIAGNOSIS — K62.5 RECTAL BLEEDING: ICD-10-CM

## 2023-03-06 DIAGNOSIS — R10.9 ABDOMINAL PAIN: ICD-10-CM

## 2023-03-06 DIAGNOSIS — K59.01 CONSTIPATION BY DELAYED COLONIC TRANSIT: ICD-10-CM

## 2023-03-06 PROBLEM — 440544005: Status: ACTIVE | Noted: 2023-03-06

## 2023-03-06 PROBLEM — 85057007: Status: ACTIVE | Noted: 2022-02-16

## 2023-03-06 PROBLEM — 35298007: Status: ACTIVE | Noted: 2023-03-06

## 2023-03-06 PROBLEM — 197125005: Status: ACTIVE | Noted: 2023-01-09

## 2023-03-06 PROCEDURE — 99214 OFFICE O/P EST MOD 30 MIN: CPT | Performed by: INTERNAL MEDICINE

## 2023-03-06 RX ORDER — PANTOPRAZOLE SODIUM 40 MG/1
1 TABLET TABLET, DELAYED RELEASE ORAL TWICE A DAY
Qty: 180 TABLET | Refills: 3 | Status: ACTIVE | COMMUNITY
Start: 2023-01-09

## 2023-03-06 RX ORDER — ONDANSETRON HYDROCHLORIDE 4 MG/1
1 TABLET TABLET, FILM COATED ORAL
Qty: 90 | Refills: 3 | Status: ACTIVE | COMMUNITY

## 2023-03-06 RX ORDER — FAMOTIDINE 40 MG/1
1 TABLET AT BEDTIME AS NEEDED TABLET, FILM COATED ORAL ONCE A DAY
Qty: 90 TABLET | Refills: 3 | Status: ACTIVE | COMMUNITY
Start: 2023-01-09

## 2023-03-06 NOTE — HPI-TODAY'S VISIT:
Pt returns for follow up. After last visit a few weeks, her IBS chagned from diarrhea to constipation. Now less than 3 csbm weekly, but lots of bleeding and mucus with BMs. Her phlegm and upper GI s/s resolved, taking Protonix and Pepcid daily.  Hx of fatigue, diarrhea, cramping. Also mucus and pnd.   ------- Prior visit: GPP negative last visit and cipro/flagyl didn't help. IBS flare, ongoing, no red flag s/s.  ALso, mucus in throat, it is horrible in her words, clears throat and causes her distress. Seen by ENT multiple times with negative workup and told it was GERD. She otherwise denies GERD. Tried ENT abx and sprays without relief. CT head and labs at ER negative other than borderline uti, drug screen negative, mild chronic anemia, Hgb 10.8, normal MCV. Ms. Mike is a 56-year-old black female known to myself, seen last year by Dr. Mota. Here for an acute visit for abdominal pain.  Similar to prior visits. Review of her chart shows she was last seen in October.  At that appointment she was complaining of burning chest discomfort and nausea.  EGD in October 2020 that was grossly normal.  Biopsies showed reflux esophagitis as well as chronic gastritis.  Colonoscopy 2017 with Dr. Mitchell normal and colonoscopy with myself 2020 normal.  Patient states that pain is better but over the last week she has had significant sharp abdominal pain.  Is mostly on the left side.  It goes through to her back.  She is endorsing nausea as well.  She is moving her bowels.  Stools are loose and go to 3 times a day.  She has not had any rectal bleeding.  She denies any fevers or chills.  Pain is worse at night.  She admits to taking some NSAIDs prior to the pain.   Random biopsies the time of the colonoscopy were negative for inflammatory bowel disease. ==== CT 5/21 negative for acute changes and no inflammation. Small liver hemangioma stable.

## 2023-04-05 ENCOUNTER — WEB ENCOUNTER (OUTPATIENT)
Dept: URBAN - METROPOLITAN AREA SURGERY CENTER 30 | Facility: SURGERY CENTER | Age: 58
End: 2023-04-05

## 2023-04-11 ENCOUNTER — OFFICE VISIT (OUTPATIENT)
Dept: URBAN - METROPOLITAN AREA SURGERY CENTER 30 | Facility: SURGERY CENTER | Age: 58
End: 2023-04-11
Payer: COMMERCIAL

## 2023-04-11 DIAGNOSIS — K64.1 BLEEDING GRADE II HEMORRHOIDS: ICD-10-CM

## 2023-04-11 DIAGNOSIS — R10.84 ABDOMINAL CRAMPING, GENERALIZED: ICD-10-CM

## 2023-04-11 DIAGNOSIS — K62.5 ANAL BLEEDING: ICD-10-CM

## 2023-04-11 PROCEDURE — 46221 LIGATION OF HEMORRHOID(S): CPT | Performed by: INTERNAL MEDICINE

## 2023-04-11 PROCEDURE — G8907 PT DOC NO EVENTS ON DISCHARG: HCPCS | Performed by: INTERNAL MEDICINE

## 2023-04-11 PROCEDURE — 45378 DIAGNOSTIC COLONOSCOPY: CPT | Performed by: INTERNAL MEDICINE

## 2023-05-03 ENCOUNTER — OFFICE VISIT (OUTPATIENT)
Dept: URBAN - METROPOLITAN AREA CLINIC 74 | Facility: CLINIC | Age: 58
End: 2023-05-03

## 2023-05-03 RX ORDER — PANTOPRAZOLE SODIUM 40 MG/1
1 TABLET TABLET, DELAYED RELEASE ORAL TWICE A DAY
Qty: 180 TABLET | Refills: 3 | Status: ACTIVE | COMMUNITY
Start: 2023-01-09

## 2023-05-03 RX ORDER — FAMOTIDINE 40 MG/1
1 TABLET AT BEDTIME AS NEEDED TABLET, FILM COATED ORAL ONCE A DAY
Qty: 90 TABLET | Refills: 3 | Status: ACTIVE | COMMUNITY
Start: 2023-01-09

## 2023-05-03 RX ORDER — ONDANSETRON HYDROCHLORIDE 4 MG/1
1 TABLET TABLET, FILM COATED ORAL
Qty: 90 | Refills: 3 | Status: ACTIVE | COMMUNITY

## 2023-05-10 ENCOUNTER — OFFICE VISIT (OUTPATIENT)
Dept: URBAN - METROPOLITAN AREA CLINIC 74 | Facility: CLINIC | Age: 58
End: 2023-05-10

## 2023-05-10 RX ORDER — FAMOTIDINE 40 MG/1
1 TABLET AT BEDTIME AS NEEDED TABLET, FILM COATED ORAL ONCE A DAY
Qty: 90 TABLET | Refills: 3 | Status: ACTIVE | COMMUNITY
Start: 2023-01-09

## 2023-05-10 RX ORDER — ONDANSETRON HYDROCHLORIDE 4 MG/1
1 TABLET TABLET, FILM COATED ORAL
Qty: 90 | Refills: 3 | Status: ACTIVE | COMMUNITY

## 2023-05-10 RX ORDER — PANTOPRAZOLE SODIUM 40 MG/1
1 TABLET TABLET, DELAYED RELEASE ORAL TWICE A DAY
Qty: 180 TABLET | Refills: 3 | Status: ACTIVE | COMMUNITY
Start: 2023-01-09

## 2023-05-10 NOTE — HPI-TODAY'S VISIT:
Follow up hemorrhoids. Colonoscopy 3/23 negative for IBD and polyps, positive for hemorrhoids, LL banded. GERD controlled with Protonix and Pepcid. IBSM, taking Linzess 290.

## 2023-05-12 ENCOUNTER — TELEPHONE ENCOUNTER (OUTPATIENT)
Dept: URBAN - METROPOLITAN AREA CLINIC 74 | Facility: CLINIC | Age: 58
End: 2023-05-12

## 2023-05-24 ENCOUNTER — OFFICE VISIT (OUTPATIENT)
Dept: URBAN - METROPOLITAN AREA CLINIC 74 | Facility: CLINIC | Age: 58
End: 2023-05-24
Payer: COMMERCIAL

## 2023-05-24 VITALS
BODY MASS INDEX: 21.89 KG/M2 | HEART RATE: 53 BPM | TEMPERATURE: 97.6 F | DIASTOLIC BLOOD PRESSURE: 54 MMHG | SYSTOLIC BLOOD PRESSURE: 98 MMHG | WEIGHT: 131.4 LBS | OXYGEN SATURATION: 100 % | HEIGHT: 65 IN

## 2023-05-24 DIAGNOSIS — K64.1 GRADE II HEMORRHOIDS: ICD-10-CM

## 2023-05-24 PROCEDURE — 46221 LIGATION OF HEMORRHOID(S): CPT | Performed by: INTERNAL MEDICINE

## 2023-05-24 RX ORDER — ONDANSETRON HYDROCHLORIDE 4 MG/1
1 TABLET TABLET, FILM COATED ORAL
Qty: 90 | Refills: 3 | Status: ACTIVE | COMMUNITY

## 2023-05-24 RX ORDER — FAMOTIDINE 40 MG/1
1 TABLET AT BEDTIME AS NEEDED TABLET, FILM COATED ORAL ONCE A DAY
Qty: 90 TABLET | Refills: 3 | Status: ACTIVE | COMMUNITY
Start: 2023-01-09

## 2023-05-24 RX ORDER — PANTOPRAZOLE SODIUM 40 MG/1
1 TABLET TABLET, DELAYED RELEASE ORAL TWICE A DAY
Qty: 180 TABLET | Refills: 3 | Status: ACTIVE | COMMUNITY
Start: 2023-01-09

## 2023-05-24 NOTE — HPI-TODAY'S VISIT:
Follow up after colonoscopy. Hemorrhoids, band 1/3 LL at time of colonoscopy, pt doing well now. Colonoscopy negative for polyps.

## 2023-06-21 ENCOUNTER — OFFICE VISIT (OUTPATIENT)
Dept: URBAN - METROPOLITAN AREA CLINIC 74 | Facility: CLINIC | Age: 58
End: 2023-06-21

## 2023-06-21 RX ORDER — FAMOTIDINE 40 MG/1
1 TABLET AT BEDTIME AS NEEDED TABLET, FILM COATED ORAL ONCE A DAY
Qty: 90 TABLET | Refills: 3 | Status: ACTIVE | COMMUNITY
Start: 2023-01-09

## 2023-06-21 RX ORDER — ONDANSETRON HYDROCHLORIDE 4 MG/1
1 TABLET TABLET, FILM COATED ORAL
Qty: 90 | Refills: 3 | Status: ACTIVE | COMMUNITY

## 2023-06-21 RX ORDER — PANTOPRAZOLE SODIUM 40 MG/1
1 TABLET TABLET, DELAYED RELEASE ORAL TWICE A DAY
Qty: 180 TABLET | Refills: 3 | Status: ACTIVE | COMMUNITY
Start: 2023-01-09

## 2024-04-29 ENCOUNTER — OV EP (OUTPATIENT)
Dept: URBAN - METROPOLITAN AREA CLINIC 74 | Facility: CLINIC | Age: 59
End: 2024-04-29

## 2024-04-29 VITALS
TEMPERATURE: 97.3 F | HEART RATE: 50 BPM | DIASTOLIC BLOOD PRESSURE: 72 MMHG | WEIGHT: 135.4 LBS | SYSTOLIC BLOOD PRESSURE: 106 MMHG | BODY MASS INDEX: 22.56 KG/M2 | HEIGHT: 65 IN | OXYGEN SATURATION: 100 %

## 2024-04-29 RX ORDER — PANTOPRAZOLE SODIUM 40 MG/1
1 TABLET TABLET, DELAYED RELEASE ORAL TWICE A DAY
Qty: 180 TABLET | Refills: 3 | Status: ACTIVE | COMMUNITY
Start: 2023-01-09

## 2024-04-29 RX ORDER — FAMOTIDINE 40 MG/1
1 TABLET AT BEDTIME AS NEEDED TABLET, FILM COATED ORAL ONCE A DAY
Qty: 90 TABLET | Refills: 3 | Status: ON HOLD | COMMUNITY
Start: 2023-01-09

## 2024-04-29 RX ORDER — LACTULOSE 10 G/15ML
15 MILLILITER SOLUTION ORAL TWICE DAILY
Qty: 900 MILLILITER | Refills: 3 | OUTPATIENT
Start: 2024-04-30 | End: 2024-08-28

## 2024-04-29 RX ORDER — MIDODRINE HYDROCHLORIDE 5 MG/1
1 TABLET TABLET ORAL TWICE A DAY
Status: ON HOLD | COMMUNITY

## 2024-04-29 RX ORDER — ONDANSETRON HYDROCHLORIDE 4 MG/1
1 TABLET TABLET, FILM COATED ORAL
Qty: 90 | Refills: 3 | Status: ON HOLD | COMMUNITY

## 2024-04-29 NOTE — HPI-TODAY'S VISIT:
59-year-old -American female here with symptomatic Hemorrhoids.  She is she is here for unscheduled follow-up due to worsening rectal bleeding.  She has chronic constipation but seems like it is getting worse lately.  She had been given a prescription for Linzess in the past.  She is out of it at this time.  Does not really remember if it had helped her in the past or not but would like to retry. Usually sees Dr. Shearer. She has IBS with mixed pattern though constipation is more predominant.  Complains of feeling bloated.  She also has reflux and is currently on Protonix.  Gets intermittently nauseous.  Zofran does not help.  Phenergan helps.

## 2024-04-30 PROBLEM — 440630006: Status: ACTIVE | Noted: 2024-04-30

## 2024-06-05 ENCOUNTER — DASHBOARD ENCOUNTERS (OUTPATIENT)
Age: 59
End: 2024-06-05

## 2024-06-10 ENCOUNTER — OFFICE VISIT (OUTPATIENT)
Dept: URBAN - METROPOLITAN AREA CLINIC 74 | Facility: CLINIC | Age: 59
End: 2024-06-10

## 2024-06-10 RX ORDER — MIDODRINE HYDROCHLORIDE 5 MG/1
1 TABLET TABLET ORAL TWICE A DAY
Status: ON HOLD | COMMUNITY

## 2024-06-10 RX ORDER — ONDANSETRON HYDROCHLORIDE 4 MG/1
1 TABLET TABLET, FILM COATED ORAL
Qty: 90 | Refills: 3 | Status: ON HOLD | COMMUNITY

## 2024-06-10 RX ORDER — PANTOPRAZOLE SODIUM 40 MG/1
1 TABLET TABLET, DELAYED RELEASE ORAL TWICE A DAY
Qty: 180 TABLET | Refills: 3 | Status: ACTIVE | COMMUNITY
Start: 2023-01-09

## 2024-06-10 RX ORDER — FAMOTIDINE 40 MG/1
1 TABLET AT BEDTIME AS NEEDED TABLET, FILM COATED ORAL ONCE A DAY
Qty: 90 TABLET | Refills: 3 | Status: ON HOLD | COMMUNITY
Start: 2023-01-09

## 2024-06-10 RX ORDER — LACTULOSE 10 G/15ML
15 MILLILITER SOLUTION ORAL TWICE DAILY
Qty: 900 MILLILITER | Refills: 3 | Status: ACTIVE | COMMUNITY
Start: 2024-04-30 | End: 2024-08-28

## 2024-07-03 ENCOUNTER — OFFICE VISIT (OUTPATIENT)
Dept: URBAN - METROPOLITAN AREA CLINIC 74 | Facility: CLINIC | Age: 59
End: 2024-07-03
Payer: COMMERCIAL

## 2024-07-03 ENCOUNTER — LAB OUTSIDE AN ENCOUNTER (OUTPATIENT)
Dept: URBAN - METROPOLITAN AREA CLINIC 74 | Facility: CLINIC | Age: 59
End: 2024-07-03

## 2024-07-03 VITALS
HEIGHT: 65 IN | WEIGHT: 135 LBS | BODY MASS INDEX: 22.49 KG/M2 | HEART RATE: 59 BPM | SYSTOLIC BLOOD PRESSURE: 92 MMHG | TEMPERATURE: 97.9 F | DIASTOLIC BLOOD PRESSURE: 60 MMHG | OXYGEN SATURATION: 99 %

## 2024-07-03 DIAGNOSIS — K76.89 LIVER CYST: ICD-10-CM

## 2024-07-03 DIAGNOSIS — R10.9 ABDOMINAL PAIN: ICD-10-CM

## 2024-07-03 DIAGNOSIS — R42 LIGHTHEADED: ICD-10-CM

## 2024-07-03 DIAGNOSIS — R11.0 NAUSEA: ICD-10-CM

## 2024-07-03 DIAGNOSIS — K62.5 RECTAL BLEEDING: ICD-10-CM

## 2024-07-03 DIAGNOSIS — K58.2 MIXED IRRITABLE BOWEL SYNDROME: ICD-10-CM

## 2024-07-03 DIAGNOSIS — K64.0 GRADE I INTERNAL HEMORRHOIDS: ICD-10-CM

## 2024-07-03 DIAGNOSIS — K59.01 CONSTIPATION BY DELAYED COLONIC TRANSIT: ICD-10-CM

## 2024-07-03 DIAGNOSIS — K58.1 IRRITABLE BOWEL SYNDROME WITH CONSTIPATION: ICD-10-CM

## 2024-07-03 DIAGNOSIS — J38.7 MUCUS POOLING IN LARYNX: ICD-10-CM

## 2024-07-03 PROCEDURE — 99214 OFFICE O/P EST MOD 30 MIN: CPT | Performed by: INTERNAL MEDICINE

## 2024-07-03 RX ORDER — MIDODRINE HYDROCHLORIDE 5 MG/1
1 TABLET TABLET ORAL TWICE A DAY
Status: ON HOLD | COMMUNITY

## 2024-07-03 RX ORDER — PANTOPRAZOLE SODIUM 40 MG/1
1 TABLET TABLET, DELAYED RELEASE ORAL TWICE A DAY
Qty: 180 TABLET | Refills: 3 | Status: ACTIVE | COMMUNITY
Start: 2023-01-09

## 2024-07-03 RX ORDER — ONDANSETRON HYDROCHLORIDE 4 MG/1
1 TABLET TABLET, FILM COATED ORAL
Qty: 90 | Refills: 3 | Status: ON HOLD | COMMUNITY

## 2024-07-03 RX ORDER — FAMOTIDINE 40 MG/1
1 TABLET AT BEDTIME AS NEEDED TABLET, FILM COATED ORAL ONCE A DAY
Qty: 90 TABLET | Refills: 3 | Status: ON HOLD | COMMUNITY
Start: 2023-01-09

## 2024-07-03 RX ORDER — LACTULOSE 10 G/15ML
15 MILLILITER SOLUTION ORAL TWICE DAILY
Qty: 900 MILLILITER | Refills: 3 | Status: ACTIVE | COMMUNITY
Start: 2024-04-30 | End: 2024-08-28

## 2024-07-03 RX ORDER — PLECANATIDE 3 MG/1
1 TABLET TABLET ORAL ONCE A DAY
Qty: 90 | Refills: 3 | OUTPATIENT
Start: 2024-07-03 | End: 2025-06-28

## 2024-07-03 NOTE — PHYSICAL EXAM GASTROINTESTINAL
SICU TRANSFER NOTE      Pt being transferred to [id number]. Report called to DANA Vu. VSS prior to transfer. Q2 hr Flap Checks continued; Flap Pink, warm, with strong + pulses. Accu Checks Q4 with stepdown Insulin gtt. Per Dr. MICHELLE Juan, Remote Telemetry not needed. Patient updated on POC; questions and concerns addressed. All personal belongings collected and sent with patient.    SKIN NOTE:  Skin: CDI; no signs of breakdown or pressure injury noted.   Skin precautions maintained including:  Heels with foam dressing in place for pressure protection. Patient turns and repositions independently. Frequent weight shift encouraged. Pressure points protected. Positioning supports utilized. Skin-to-device areas padded. Skin-to-skin areas padded     Abdomen , soft, nontender, nondistended , no guarding or rigidity , no masses palpable , normal bowel sounds , Liver and Spleen , no hepatomegaly present , no hepatosplenomegaly , liver nontender , spleen not palpable

## 2024-07-03 NOTE — HPI-TODAY'S VISIT:
Pt returns for follow up. Seen by Dr. Blanc a few weeks ago, seen by myself 2023 for dyspepsia and hemorrhoid banding. Pt has seen all physicians in this pod, she usually requests urgent same day visits and lacks continuity of care with a single provider. Colonoscopy 2017 and 2020 negative here. 4/2024 Dr. Blanc started lactulose for constipation and samples of Linzess and recommended f/u for hemorrhoids. After last visit a few weeks, her IBS chagned from diarrhea to constipation. Now less than 3 csbm weekly, but lots of bleeding and mucus with BMs. Her phlegm and upper GI s/s resolved, taking Protonix and Pepcid daily. Hx of fatigue, diarrhea, cramping. Also mucus and pnd.   ------- Prior visit: GPP negative last visit and cipro/flagyl didn't help. IBS flare, ongoing, no red flag s/s. ALso, mucus in throat, it is horrible in her words, clears throat and causes her distress. Seen by ENT multiple times with negative workup and told it was GERD. She otherwise denies GERD. Tried ENT abx and sprays without relief. CT head and labs at ER negative other than borderline uti, drug screen negative, mild chronic anemia, Hgb 10.8, normal MCV. Ms. Mike is a 56-year-old black female known to myself, seen last year by Dr. Mota. Here for an acute visit for abdominal pain. Similar to prior visits. Review of her chart shows she was last seen in October. At that appointment she was complaining of burning chest discomfort and nausea. EGD in October 2020 that was grossly normal. Biopsies showed reflux esophagitis as well as chronic gastritis. Colonoscopy 2017 with Dr. Mitchell normal and colonoscopy with myself 2020 normal. Patient states that pain is better but over the last week she has had significant sharp abdominal pain. Is mostly on the left side. It goes through to her back. She is endorsing nausea as well. She is moving her bowels. Stools are loose and go to 3 times a day. She has not had any rectal bleeding. She denies any fevers or chills. Pain is worse at night. She admits to taking some NSAIDs prior to the pain. Random biopsies the time of the colonoscopy were negative for inflammatory bowel disease. ==== CT 5/21 negative for acute changes and no inflammation. Small liver hemangioma stable.

## 2024-07-19 ENCOUNTER — TELEPHONE ENCOUNTER (OUTPATIENT)
Dept: URBAN - METROPOLITAN AREA CLINIC 74 | Facility: CLINIC | Age: 59
End: 2024-07-19

## 2024-10-02 ENCOUNTER — OFFICE VISIT (OUTPATIENT)
Dept: URBAN - METROPOLITAN AREA CLINIC 74 | Facility: CLINIC | Age: 59
End: 2024-10-02
Payer: COMMERCIAL

## 2024-10-02 VITALS
DIASTOLIC BLOOD PRESSURE: 76 MMHG | TEMPERATURE: 97.5 F | BODY MASS INDEX: 22.33 KG/M2 | HEART RATE: 57 BPM | HEIGHT: 65 IN | WEIGHT: 134 LBS | SYSTOLIC BLOOD PRESSURE: 104 MMHG | OXYGEN SATURATION: 97 %

## 2024-10-02 DIAGNOSIS — K76.89 LIVER CYST: ICD-10-CM

## 2024-10-02 DIAGNOSIS — R10.9 ABDOMINAL PAIN: ICD-10-CM

## 2024-10-02 DIAGNOSIS — R42 LIGHTHEADED: ICD-10-CM

## 2024-10-02 DIAGNOSIS — K64.0 GRADE I INTERNAL HEMORRHOIDS: ICD-10-CM

## 2024-10-02 DIAGNOSIS — R11.0 NAUSEA: ICD-10-CM

## 2024-10-02 DIAGNOSIS — J38.7 MUCUS POOLING IN LARYNX: ICD-10-CM

## 2024-10-02 DIAGNOSIS — K62.5 RECTAL BLEEDING: ICD-10-CM

## 2024-10-02 DIAGNOSIS — K59.01 CONSTIPATION BY DELAYED COLONIC TRANSIT: ICD-10-CM

## 2024-10-02 DIAGNOSIS — K58.1 IRRITABLE BOWEL SYNDROME WITH CONSTIPATION: ICD-10-CM

## 2024-10-02 PROCEDURE — 99214 OFFICE O/P EST MOD 30 MIN: CPT | Performed by: INTERNAL MEDICINE

## 2024-10-02 RX ORDER — ONDANSETRON HYDROCHLORIDE 4 MG/1
1 TABLET TABLET, FILM COATED ORAL
Qty: 90 | Refills: 3 | Status: ON HOLD | COMMUNITY

## 2024-10-02 RX ORDER — TENAPANOR HYDROCHLORIDE 53.2 MG/1
1 TABLET IMMEDIATELY BEFORE MEALS TABLET ORAL TWICE A DAY
Qty: 180 TABLET | Refills: 3 | OUTPATIENT
Start: 2024-10-02 | End: 2025-09-27

## 2024-10-02 RX ORDER — PLECANATIDE 3 MG/1
1 TABLET TABLET ORAL ONCE A DAY
Qty: 90 | Refills: 3 | Status: ACTIVE | COMMUNITY
Start: 2024-07-03 | End: 2025-06-28

## 2024-10-02 RX ORDER — PANTOPRAZOLE SODIUM 40 MG/1
1 TABLET TABLET, DELAYED RELEASE ORAL TWICE A DAY
Qty: 180 TABLET | Refills: 3 | Status: ACTIVE | COMMUNITY
Start: 2023-01-09

## 2024-10-02 RX ORDER — MIDODRINE HYDROCHLORIDE 5 MG/1
1 TABLET TABLET ORAL TWICE A DAY
Status: ON HOLD | COMMUNITY

## 2024-10-02 RX ORDER — FAMOTIDINE 40 MG/1
1 TABLET AT BEDTIME AS NEEDED TABLET, FILM COATED ORAL ONCE A DAY
Qty: 90 TABLET | Refills: 3 | Status: ON HOLD | COMMUNITY
Start: 2023-01-09

## 2024-10-02 NOTE — HPI-TODAY'S VISIT:
--Follow up after CT. --Hx IBS and bloating and chronic abdominal pain. Functional pain. --CT abd/pelvis 7/2024 negative for acute changes. Hx liver cysts, uterine fibroids and small kidney stones noted. --Plan last visit: Stop Linzess and Lactulose  Rx: Trulance one ernandez Xifaxan 550 tid 6 days free samples --Colonoscopy 2017 and 2020 negative here. --EGD 2020 normal here.   ======== Prior note: Pt returns for follow up. Seen by Dr. Blanc a few weeks ago, seen by myself 2023 for dyspepsia and hemorrhoid banding. Pt has seen all physicians in this pod, she usually requests urgent same day visits and lacks continuity of care with a single provider.  4/2024 Dr. Blanc started lactulose for constipation and samples of Linzess and recommended f/u for hemorrhoids. After last visit a few weeks, her IBS chagned from diarrhea to constipation. Now less than 3 csbm weekly, but lots of bleeding and mucus with BMs. Her phlegm and upper GI s/s resolved, taking Protonix and Pepcid daily. Hx of fatigue, diarrhea, cramping. Also mucus and pnd.   IBS flare, ongoing, no red flag s/s. ALso, mucus in throat, it is horrible in her words, clears throat and causes her distress. Seen by ENT multiple times with negative workup and told it was GERD. She otherwise denies GERD. Tried ENT abx and sprays without relief. CT head and labs at ER negative other than borderline uti, drug screen negative, mild chronic anemia, Hgb 10.8, normal MCV. Ms. Mike is a 56-year-old black female known to myself, seen last year by Dr. Mota. Here for an acute visit for abdominal pain. Similar to prior visits. Review of her chart shows she was last seen in October. At that appointment she was complaining of burning chest discomfort and nausea. EGD in October 2020 that was grossly normal. Biopsies showed reflux esophagitis as well as chronic gastritis. Colonoscopy 2017 with Dr. Mitchell normal and colonoscopy with myself 2020 normal. Patient states that pain is better but over the last week she has had significant sharp abdominal pain. Is mostly on the left side. It goes through to her back. She is endorsing nausea as well. She is moving her bowels. Stools are loose and go to 3 times a day. She has not had any rectal bleeding. She denies any fevers or chills. Pain is worse at night. She admits to taking some NSAIDs prior to the pain. Random biopsies the time of the colonoscopy were negative for inflammatory bowel disease.

## 2024-10-03 ENCOUNTER — TELEPHONE ENCOUNTER (OUTPATIENT)
Dept: URBAN - METROPOLITAN AREA CLINIC 74 | Facility: CLINIC | Age: 59
End: 2024-10-03

## 2024-10-03 RX ORDER — PANTOPRAZOLE SODIUM 40 MG/1
1 TABLET TABLET, DELAYED RELEASE ORAL TWICE A DAY
Qty: 180 TABLET | Refills: 3
Start: 2023-01-09

## 2024-10-03 RX ORDER — TENAPANOR HYDROCHLORIDE 53.2 MG/1
1 TABLET IMMEDIATELY BEFORE MEALS TABLET ORAL TWICE A DAY
Qty: 180 TABLET | Refills: 3
Start: 2024-10-02 | End: 2025-09-28

## 2024-10-04 ENCOUNTER — TELEPHONE ENCOUNTER (OUTPATIENT)
Dept: URBAN - METROPOLITAN AREA CLINIC 74 | Facility: CLINIC | Age: 59
End: 2024-10-04

## 2025-04-02 ENCOUNTER — LAB OUTSIDE AN ENCOUNTER (OUTPATIENT)
Dept: URBAN - METROPOLITAN AREA CLINIC 74 | Facility: CLINIC | Age: 60
End: 2025-04-02

## 2025-04-02 ENCOUNTER — OFFICE VISIT (OUTPATIENT)
Dept: URBAN - METROPOLITAN AREA CLINIC 74 | Facility: CLINIC | Age: 60
End: 2025-04-02
Payer: COMMERCIAL

## 2025-04-02 DIAGNOSIS — K76.89 LIVER CYST: ICD-10-CM

## 2025-04-02 DIAGNOSIS — R11.0 NAUSEA: ICD-10-CM

## 2025-04-02 DIAGNOSIS — K62.5 RECTAL BLEEDING: ICD-10-CM

## 2025-04-02 DIAGNOSIS — R42 LIGHTHEADED: ICD-10-CM

## 2025-04-02 DIAGNOSIS — R10.84 ABDOMINAL CRAMPING, GENERALIZED: ICD-10-CM

## 2025-04-02 DIAGNOSIS — J38.7 MUCUS POOLING IN LARYNX: ICD-10-CM

## 2025-04-02 DIAGNOSIS — K58.1 IRRITABLE BOWEL SYNDROME WITH CONSTIPATION: ICD-10-CM

## 2025-04-02 DIAGNOSIS — K64.0 GRADE I INTERNAL HEMORRHOIDS: ICD-10-CM

## 2025-04-02 PROCEDURE — 99214 OFFICE O/P EST MOD 30 MIN: CPT | Performed by: INTERNAL MEDICINE

## 2025-04-02 RX ORDER — FAMOTIDINE 40 MG/1
1 TABLET AT BEDTIME AS NEEDED TABLET, FILM COATED ORAL ONCE A DAY
Qty: 90 TABLET | Refills: 3 | Status: ON HOLD | COMMUNITY
Start: 2023-01-09

## 2025-04-02 RX ORDER — TENAPANOR HYDROCHLORIDE 53.2 MG/1
1 TABLET IMMEDIATELY BEFORE MEALS TABLET ORAL TWICE A DAY
Qty: 180 TABLET | Refills: 3 | OUTPATIENT
Start: 2025-04-02

## 2025-04-02 RX ORDER — PLECANATIDE 3 MG/1
1 TABLET TABLET ORAL ONCE A DAY
Qty: 90 | Refills: 3 | Status: ACTIVE | COMMUNITY
Start: 2024-07-03 | End: 2025-06-28

## 2025-04-02 RX ORDER — TENAPANOR HYDROCHLORIDE 53.2 MG/1
1 TABLET IMMEDIATELY BEFORE MEALS TABLET ORAL TWICE A DAY
Qty: 180 TABLET | Refills: 3 | Status: ACTIVE | COMMUNITY
Start: 2024-10-02 | End: 2025-09-28

## 2025-04-02 RX ORDER — PANTOPRAZOLE SODIUM 40 MG/1
1 TABLET TABLET, DELAYED RELEASE ORAL TWICE A DAY
Qty: 180 TABLET | Refills: 3 | Status: ACTIVE | COMMUNITY
Start: 2023-01-09

## 2025-04-02 RX ORDER — ONDANSETRON HYDROCHLORIDE 4 MG/1
1 TABLET TABLET, FILM COATED ORAL
Qty: 90 | Refills: 3 | Status: ON HOLD | COMMUNITY

## 2025-04-02 RX ORDER — MIDODRINE HYDROCHLORIDE 5 MG/1
1 TABLET TABLET ORAL TWICE A DAY
Status: ON HOLD | COMMUNITY

## 2025-04-02 NOTE — HPI-TODAY'S VISIT:
--Follow up visit. Same issues as prior multiple visits. Bloating and abdominal pain and mucus in throat. Last seen 10/2024. --Hx IBS and bloating and chronic abdominal pain. Functional pain. --CT abd/pelvis 7/2024 negative for acute changes. Hx liver cysts, uterine fibroids and small kidney stones noted. --Plan last visit: --Stop Linzess and Lactulose and stop Trulance one ernandez.  We started IBSRela bid. She is doing very WELL on IBSRELA, she takes one daily (bid causes diarrhea) and BM every 2 days with csbm.  -- Still c/o musus in throat. Not better with trials of PPI. --Xifaxan 550 tid 6 days free samples --Colonoscopy 2017 and 2020 negative here. --EGD 2020 normal here.   ======== Prior note: Pt returns for follow up. Seen by Dr. lBanc a few weeks ago, seen by myself 2023 for dyspepsia and hemorrhoid banding. Pt has seen all physicians in this pod, she usually requests urgent same day visits and lacks continuity of care with a single provider.  4/2024 Dr. Blnac started lactulose for constipation and samples of Linzess and recommended f/u for hemorrhoids. After last visit a few weeks, her IBS chagned from diarrhea to constipation. Now less than 3 csbm weekly, but lots of bleeding and mucus with BMs. Her phlegm and upper GI s/s resolved, taking Protonix and Pepcid daily. Hx of fatigue, diarrhea, cramping. Also mucus and pnd.   IBS flare, ongoing, no red flag s/s. ALso, mucus in throat, it is horrible in her words, clears throat and causes her distress. Seen by ENT multiple times with negative workup and told it was GERD. She otherwise denies GERD. Tried ENT abx and sprays without relief. CT head and labs at ER negative other than borderline uti, drug screen negative, mild chronic anemia, Hgb 10.8, normal MCV. Ms. Mike is a 56-year-old black female known to myself, seen last year by Dr. Mota. Here for an acute visit for abdominal pain. Similar to prior visits. Review of her chart shows she was last seen in October. At that appointment she was complaining of burning chest discomfort and nausea. EGD in October 2020 that was grossly normal. Biopsies showed reflux esophagitis as well as chronic gastritis. Colonoscopy 2017 with Dr. Mitchell normal and colonoscopy with myself 2020 normal. Patient states that pain is better but over the last week she has had significant sharp abdominal pain. Is mostly on the left side. It goes through to her back. She is endorsing nausea as well. She is moving her bowels. Stools are loose and go to 3 times a day. She has not had any rectal bleeding. She denies any fevers or chills. Pain is worse at night. She admits to taking some NSAIDs prior to the pain. Random biopsies the time of the colonoscopy were negative for inflammatory bowel disease.

## 2025-04-04 ENCOUNTER — CLAIMS CREATED FROM THE CLAIM WINDOW (OUTPATIENT)
Dept: URBAN - METROPOLITAN AREA SURGERY CENTER 30 | Facility: SURGERY CENTER | Age: 60
End: 2025-04-04
Payer: COMMERCIAL

## 2025-04-04 ENCOUNTER — CLAIMS CREATED FROM THE CLAIM WINDOW (OUTPATIENT)
Dept: URBAN - METROPOLITAN AREA CLINIC 4 | Facility: CLINIC | Age: 60
End: 2025-04-04
Payer: COMMERCIAL

## 2025-04-04 DIAGNOSIS — R10.13 EPIGASTRIC ABDOMINAL PAIN: ICD-10-CM

## 2025-04-04 DIAGNOSIS — K31.89 OTHER DISEASES OF STOMACH AND DUODENUM: ICD-10-CM

## 2025-04-04 PROCEDURE — 88305 TISSUE EXAM BY PATHOLOGIST: CPT | Performed by: PATHOLOGY

## 2025-04-04 PROCEDURE — 43239 EGD BIOPSY SINGLE/MULTIPLE: CPT | Performed by: INTERNAL MEDICINE

## 2025-04-04 PROCEDURE — 00731 ANES UPR GI NDSC PX NOS: CPT | Performed by: NURSE ANESTHETIST, CERTIFIED REGISTERED

## 2025-04-21 PROBLEM — 8493009: Status: ACTIVE | Noted: 2025-04-21

## 2025-04-21 PROBLEM — 397881000: Status: ACTIVE | Noted: 2025-04-21

## 2025-04-29 ENCOUNTER — OFFICE VISIT (OUTPATIENT)
Dept: URBAN - METROPOLITAN AREA CLINIC 74 | Facility: CLINIC | Age: 60
End: 2025-04-29

## 2025-04-29 RX ORDER — TENAPANOR HYDROCHLORIDE 53.2 MG/1
1 TABLET IMMEDIATELY BEFORE MEALS TABLET ORAL TWICE A DAY
Qty: 180 TABLET | Refills: 3 | Status: ACTIVE | COMMUNITY
Start: 2025-04-02

## 2025-04-29 RX ORDER — PANTOPRAZOLE SODIUM 40 MG/1
1 TABLET TABLET, DELAYED RELEASE ORAL TWICE A DAY
Qty: 180 TABLET | Refills: 3 | Status: ACTIVE | COMMUNITY
Start: 2023-01-09

## 2025-04-29 RX ORDER — MIDODRINE HYDROCHLORIDE 5 MG/1
1 TABLET TABLET ORAL TWICE A DAY
Status: ON HOLD | COMMUNITY

## 2025-04-29 RX ORDER — FAMOTIDINE 40 MG/1
1 TABLET AT BEDTIME AS NEEDED TABLET, FILM COATED ORAL ONCE A DAY
Qty: 90 TABLET | Refills: 3 | Status: ON HOLD | COMMUNITY
Start: 2023-01-09

## 2025-04-29 RX ORDER — ONDANSETRON HYDROCHLORIDE 4 MG/1
1 TABLET TABLET, FILM COATED ORAL
Qty: 90 | Refills: 3 | Status: ON HOLD | COMMUNITY

## 2025-04-29 RX ORDER — PLECANATIDE 3 MG/1
1 TABLET TABLET ORAL ONCE A DAY
Qty: 90 | Refills: 3 | Status: ACTIVE | COMMUNITY
Start: 2024-07-03 | End: 2025-06-28

## 2025-04-29 NOTE — HPI-TODAY'S VISIT:
The patient is 60-year-old female with past medical history as noted below known to Dr. Shearer is presenting to our clinic today to discuss her recent EGD results.  The patient continues on Pantoprazole 40 mg 1 tablet twice daily.  She has been tried on lactulose, Trulance, and Linzess with poor response.  She is currently on Ibsrela 50 mg 1 tablet once or twice daily.  She was also given sample of Voquenza 20 mg last visit.  Procedures: -- EGD with biopsy on 04/04/22025 by Dr. Shearer noted normal esophagus.  Normal stomach.  Normal examined duodenum.  Biopsy with no significant abnormality.  -- Colonoscopy on 04/11/2023 by Dr. Shearer noted the examined portion of the ileum was normal.  The entire examined colon is normal.  Diverticulosis in the sigmoid colon.  Internal hemorrhoids.  Status post hemorrhoid banding.  No specimen collected.

## 2025-07-09 ENCOUNTER — OFFICE VISIT (OUTPATIENT)
Dept: URBAN - METROPOLITAN AREA CLINIC 74 | Facility: CLINIC | Age: 60
End: 2025-07-09
Payer: COMMERCIAL

## 2025-07-09 DIAGNOSIS — R11.0 NAUSEA: ICD-10-CM

## 2025-07-09 DIAGNOSIS — R42 LIGHTHEADED: ICD-10-CM

## 2025-07-09 DIAGNOSIS — K58.1 IRRITABLE BOWEL SYNDROME WITH CONSTIPATION: ICD-10-CM

## 2025-07-09 DIAGNOSIS — K62.5 RECTAL BLEEDING: ICD-10-CM

## 2025-07-09 DIAGNOSIS — R10.84 ABDOMINAL CRAMPING, GENERALIZED: ICD-10-CM

## 2025-07-09 DIAGNOSIS — J38.7 MUCUS POOLING IN LARYNX: ICD-10-CM

## 2025-07-09 DIAGNOSIS — K76.89 LIVER CYST: ICD-10-CM

## 2025-07-09 DIAGNOSIS — K64.0 GRADE I INTERNAL HEMORRHOIDS: ICD-10-CM

## 2025-07-09 PROCEDURE — 99214 OFFICE O/P EST MOD 30 MIN: CPT | Performed by: INTERNAL MEDICINE

## 2025-07-09 RX ORDER — TENAPANOR HYDROCHLORIDE 53.2 MG/1
1 TABLET IMMEDIATELY BEFORE MEALS TABLET ORAL TWICE A DAY
Qty: 180 TABLET | Refills: 3 | OUTPATIENT
Start: 2025-07-09

## 2025-07-09 RX ORDER — TENAPANOR HYDROCHLORIDE 53.2 MG/1
1 TABLET IMMEDIATELY BEFORE MEALS TABLET ORAL TWICE A DAY
Qty: 180 TABLET | Refills: 3 | Status: ACTIVE | COMMUNITY
Start: 2025-04-02

## 2025-07-09 RX ORDER — PANTOPRAZOLE SODIUM 40 MG/1
1 TABLET TABLET, DELAYED RELEASE ORAL ONCE A DAY
Qty: 90 TABLET | Refills: 3 | OUTPATIENT
Start: 2025-07-09

## 2025-07-09 RX ORDER — FAMOTIDINE 40 MG/1
1 TABLET AT BEDTIME AS NEEDED TABLET, FILM COATED ORAL ONCE A DAY
Qty: 90 TABLET | Refills: 3 | Status: ON HOLD | COMMUNITY
Start: 2023-01-09

## 2025-07-09 RX ORDER — MIDODRINE HYDROCHLORIDE 5 MG/1
1 TABLET TABLET ORAL TWICE A DAY
Status: ON HOLD | COMMUNITY

## 2025-07-09 RX ORDER — ONDANSETRON HYDROCHLORIDE 4 MG/1
1 TABLET TABLET, FILM COATED ORAL
Qty: 90 | Refills: 3 | Status: ON HOLD | COMMUNITY

## 2025-07-09 RX ORDER — PANTOPRAZOLE SODIUM 40 MG/1
1 TABLET TABLET, DELAYED RELEASE ORAL TWICE A DAY
Qty: 180 TABLET | Refills: 3 | Status: ACTIVE | COMMUNITY
Start: 2023-01-09

## 2025-07-09 NOTE — HPI-TODAY'S VISIT:
The patient is 60-year-old female with past medical history as noted below known to me is presenting to our clinic today to discuss her recent EGD results. The patient continues on Pantoprazole 40 mg 1 tablet twice daily. She has been tried on lactulose, Trulance, and Linzess with poor response. She is currently on Ibsrela 50 mg 1 tablet once or twice daily. She was also given sample of Voquenza 20 mg last visit.  Procedures: -- EGD with biopsy on 04/04/22025 by Dr. Shearer noted normal esophagus. Normal stomach. Normal examined duodenum. Biopsy with no significant abnormality.  -- Colonoscopy on 04/11/2023 by Dr. Shearer noted the examined portion of the ileum was normal. The entire examined colon is normal. Diverticulosis in the sigmoid colon. Internal hemorrhoids. Status post hemorrhoid banding. No specimen collected.  --Hx IBS and bloating and chronic abdominal pain. Functional pain. --CT abd/pelvis 7/2024 negative for acute changes. Hx liver cysts, uterine fibroids and small kidney stones noted. --Plan last visit: -- Still c/o musus in throat. Not better with trials of PPI. --Xifaxan 550 tid 6 days free samples --Colonoscopy 2017 and 2020 negative here. --EGD 2020 normal here.   ======== Prior note: Pt returns for follow up. Seen by Dr. Blanc a few weeks ago, seen by myself 2023 for dyspepsia and hemorrhoid banding. Pt has seen all physicians in this pod, she usually requests urgent same day visits and lacks continuity of care with a single provider.  4/2024 Dr. Blanc started lactulose for constipation and samples of Linzess and recommended f/u for hemorrhoids. After last visit a few weeks, her IBS chagned from diarrhea to constipation. Now less than 3 csbm weekly, but lots of bleeding and mucus with BMs. Her phlegm and upper GI s/s resolved, taking Protonix and Pepcid daily. Hx of fatigue, diarrhea, cramping. Also mucus and pnd.   IBS flare, ongoing, no red flag s/s. ALso, mucus in throat, it is horrible in her words, clears throat and causes her distress. Seen by ENT multiple times with negative workup and told it was GERD. She otherwise denies GERD. Tried ENT abx and sprays without relief. CT head and labs at ER negative other than borderline uti, drug screen negative, mild chronic anemia, Hgb 10.8, normal MCV. Ms. Mike is a 56-year-old black female known to myself, seen last year by Dr. Mota. Here for an acute visit for abdominal pain. Similar to prior visits. Review of her chart shows she was last seen in October. At that appointment she was complaining of burning chest discomfort and nausea. EGD in October 2020 that was grossly normal. Biopsies showed reflux esophagitis as well as chronic gastritis. Colonoscopy 2017 with Dr. Mitchell normal and colonoscopy with myself 2020 normal. Patient states that pain is better but over the last week she has had significant sharp abdominal pain. Is mostly on the left side. It goes through to her back. She is endorsing nausea as well. She is moving her bowels. Stools are loose and go to 3 times a day. She has not had any rectal bleeding. She denies any fevers or chills. Pain is worse at night. She admits to taking some NSAIDs prior to the pain. Random biopsies the time of the colonoscopy were negative for inflammatory bowel disease.